# Patient Record
(demographics unavailable — no encounter records)

---

## 2024-12-04 NOTE — HISTORY OF PRESENT ILLNESS
[TextBox_4] : Vicki is a [id number]-jcw female being seen today for evaluation of a febrile UTI. Starting on 10/8/2024: Patient seen in [facility] ER for symptoms of fever up to 102-degree Fahrenheit and dysuria. No foul-smelling urine or hematuria. UCx resulted on 10/11/2024: 10,000-49,000 E. coli. Parent states patient antibiotics were switched from Keflex to levofloxacin and completed the treatment. Patient currently asymptomatic as per parent. No hematuria, dysuria, or other voiding complaints. No known injuries to the kidneys or genital area. History of constipation; has daily bowel movements of varying consistencies. Reports straining, vomiting, and pain. No bleeding. Has not had previous use of stool/laxatives.

## 2024-12-04 NOTE — DATA REVIEWED
[FreeTextEntry1] : EXAMINATION: RENAL/BLADDER AND PELVIC ULTRASOUND PERFORMED IN THE OFFICE TODAY  FINDINGS: UNREMARKABLE KIDNEYS AND PELVIC STRUCTURES

## 2024-12-04 NOTE — CONSULT LETTER
[FreeTextEntry1] :  Dear Dr. SJ WELSH,  I had the pleasure of consulting on ERIC RAKHJANIS today. Below is my note regarding the office visit today.  Thank you so very much for allowing me to participate in ERIC's care. Please don't hesitate to call me should any questions or issues arise.  Sincerely, Omar Zhang MD, FACS, [facility] Chief, Pediatric Urology Professor of Urology and Pediatrics [facility] [employer]  President, [employer] - [place] Section Past-President, Societies for Pediatric Urology

## 2024-12-04 NOTE — ASSESSMENT
[FreeTextEntry1] : Vicki had a febrile UTI. She has constipation based on the history gathered today. Today's physical exam and renal/bladder ultrasound performed in-office were unremarkable. We discussed the possible causes and implications. I spoke at length regarding the proper evaluation of the urinary tract in the setting of a febrile infection. The following plan was decided upon:   - Increase PO water intake - Proper wiping techniques discussed with demonstration - Pear Juice daily to soften bowels - Refer to gastroenterology for constipation. Contact information provided.  - Considered a VCUG for interval febrile UTIs.    Parent verbalizes understanding of the plan and state all questions were addressed to their satisfaction.

## 2024-12-04 NOTE — PHYSICAL EXAM
[Well developed] : well developed [Well nourished] : well nourished [Well appearing] : well appearing [1] : 1 [Acute distress] : no acute distress [Labored breathing] : non- labored breathing [Rigid] : not rigid [Mass] : no mass [Hepatomegaly] : no hepatomegaly [Splenomegaly] : no splenomegaly [Palpable bladder] : no palpable bladder [RUQ Tenderness] : no ruq tenderness [LUQ Tenderness] : no luq tenderness [RLQ Tenderness] : no rlq tenderness [LLQ Tenderness] : no llq tenderness [Right tenderness] : no right tenderness [Left tenderness] : no left tenderness [Renomegaly] : no renomegaly [Right-side mass] : no right-side mass [Left-side mass] : no left-side mass [Dimple] : no dimple [Hair Tuft] : no hair tuft [Labial adhesions] : no labial adhesions [Introital masses] : no introital masses [Introital erythema] : no introital erythema [TextBox_92] : Examination performed by Vera Wall NP. Parent served as chaperone for examination.

## 2024-12-04 NOTE — REASON FOR VISIT
[Initial Consultation] : an initial consultation [PCP] : ~pcp~ [Parents] : parents [TextBox_50] : Febrile Urinary tract infection

## 2024-12-04 NOTE — HISTORY OF PRESENT ILLNESS
[TextBox_4] : Vicki is a 60-[id number]-sdc female being seen today for evaluation of a febrile UTI. Starting on 10/8/2024: Patient seen in [facility] ER for symptoms of fever up to 102-degree Fahrenheit and dysuria. No foul-smelling urine or hematuria. UCx resulted on 10/11/2024: 10,000-49,000 E. coli. Parent states patient antibiotics were switched from Keflex to levofloxacin and completed the treatment. Patient currently asymptomatic as per parent. No hematuria, dysuria, or other voiding complaints. No known injuries to the kidneys or genital area. History of constipation; has daily bowel movements of varying consistencies. Reports straining, vomiting, and pain. No bleeding. Has not had previous use of stool/laxatives.

## 2024-12-04 NOTE — CONSULT LETTER
[FreeTextEntry1] :  Dear Dr. SJ WELSH,  I had the pleasure of consulting on ERIC RAKHJANIS today. Below is my note regarding the office visit today.  Thank you so very much for allowing me to participate in ERIC's care. Please don't hesitate to call me should any questions or issues arise.  Sincerely, Omar Zhang MD, FACS, [facility] Chief, Pediatric Urology Professor of Urology and Pediatrics [facility] [employer] [facility] [employer]  President, American [employer] Association - [place] Section Past-President, Societies for Pediatric Urology

## 2024-12-04 NOTE — REASON FOR VISIT
None [Initial Consultation] : an initial consultation [PCP] : ~pcp~ [Parents] : parents [TextBox_50] : Febrile Urinary tract infection

## 2025-06-11 NOTE — BIRTH HISTORY
[ Section] : by  section [Prematurity at ___ weeks gestation] : Patient was born at term [FreeTextEntry3] : speech, motor delay.  Gets PT/OT and speech therapy

## 2025-06-11 NOTE — PHYSICAL EXAM
[Alert] : alert [Well Nourished] : well nourished [Healthy Appearance] : healthy appearance [No Acute Distress] : no acute distress [Well Developed] : well developed [No Discharge] : no discharge [Sclera Not Icteric] : sclera not icteric [Normal TMs] : both tympanic membranes were normal [Normal Nasal Mucosa] : the nasal mucosa was normal [Normal Lips/Tongue] : the lips and tongue were normal [Normal Outer Ear/Nose] : the ears and nose were normal in appearance [Normal Tonsils] : normal tonsils [No Thrush] : no thrush [Supple] : the neck was supple [Normal Rate and Effort] : normal respiratory rhythm and effort [No Crackles] : no crackles [No Retractions] : no retractions [Bilateral Audible Breath Sounds] : bilateral audible breath sounds [Normal Rate] : heart rate was normal  [Regular Rhythm] : with a regular rhythm [Skin Intact] : skin intact  [No Rash] : no rash [No Skin Lesions] : no skin lesions [No clubbing] : no clubbing [No Edema] : no edema [No Cyanosis] : no cyanosis [Normal Mood] : mood was normal [Normal Affect] : affect was normal [Alert, Awake, Oriented as Age-Appropriate] : alert, awake, oriented as age appropriate

## 2025-06-11 NOTE — SOCIAL HISTORY
[House] : [unfilled] lives in a house  [Radiator/Baseboard] : heating provided by radiator(s)/baseboard(s) [Window Units] : air conditioning provided by window units [None] : none [Smokers in Household] : there are no smokers in the home [de-identified] : area soledads

## 2025-06-11 NOTE — SOCIAL HISTORY
[House] : [unfilled] lives in a house  [Window Units] : air conditioning provided by window units [Radiator/Baseboard] : heating provided by radiator(s)/baseboard(s) [None] : none [Smokers in Household] : there are no smokers in the home [de-identified] : area soledads

## 2025-06-11 NOTE — REASON FOR VISIT
[Initial Consultation] : an initial consultation for [Parents] : parents [FreeTextEntry2] : food allergies

## 2025-06-11 NOTE — HISTORY OF PRESENT ILLNESS
[de-identified] : Vicki is a 84-[id number]-dte female here with mom and dad for an initial consultation for food allergies  FOOD ALLERGIES  Eggs At 7 months had scrambled eggs- immediate projectile vomiting, lasted a while, hives, trouble breathing, fever. Was seen by pediatrician, given oral steroids x 1. Wasn't herself for 2 days after, Benadryl and Zyrtec given for the next two days   A few months later tried pancakes with eggs and milk inside- immediate vomiting, mild hives, Zyrtec given with resolution after a couple of hours  Peanut When she was 13 months old given peanut butter, 2-3 hours later started vomiting 1-2 times, no hives, no trouble breathing, self-resolved Mom has not tried introducing tree nuts due to peanut reaction  Fish At about 1 year of age had white fish, vomited, no hives, gave Zyrtec Tolerates salmon  Dairy Still on nutramagin due to issue with formulas when she was a baby and continued low weight percentiles, will continue on nutramagin until 2 years old Saw pediatrician about a week ago- told to introduce milk in pancakes she developed redness around mouth, self-resolved Yogurt- redness around mouth, she refuses to eat it now, self-resolved She was feeding herself both times and pancake and yogurt touched her face  Tolerated bread, cookies, wheat Has never tried sesame, soy, shellfish  ENVIRONMENTAL ALLERGIES This spring was rough for her + sneezing, congestion, eye rubbing, runny nose  + History of eczema- uses hydrocortisone as needed, bubbsi whipped coconut oil baby lotion Cerave cream and coconut oil causes redness  + Family history food allergies- mom (pineapple), grandma (peanut) + Family history environmental allergies- dad and mom + Family history of eczema- mom, grandpa Denies family history of asthma